# Patient Record
Sex: MALE | Race: BLACK OR AFRICAN AMERICAN | NOT HISPANIC OR LATINO | URBAN - METROPOLITAN AREA
[De-identification: names, ages, dates, MRNs, and addresses within clinical notes are randomized per-mention and may not be internally consistent; named-entity substitution may affect disease eponyms.]

---

## 2023-09-08 ENCOUNTER — EMERGENCY (EMERGENCY)
Facility: HOSPITAL | Age: 28
LOS: 1 days | Discharge: HOME | End: 2023-09-08
Attending: EMERGENCY MEDICINE | Admitting: EMERGENCY MEDICINE
Payer: SELF-PAY

## 2023-09-08 VITALS
RESPIRATION RATE: 16 BRPM | OXYGEN SATURATION: 97 % | TEMPERATURE: 98 F | HEART RATE: 82 BPM | SYSTOLIC BLOOD PRESSURE: 103 MMHG | DIASTOLIC BLOOD PRESSURE: 62 MMHG

## 2023-09-08 VITALS
WEIGHT: 190.04 LBS | HEART RATE: 93 BPM | SYSTOLIC BLOOD PRESSURE: 108 MMHG | TEMPERATURE: 98 F | RESPIRATION RATE: 18 BRPM | OXYGEN SATURATION: 96 % | DIASTOLIC BLOOD PRESSURE: 69 MMHG

## 2023-09-08 DIAGNOSIS — R56.9 UNSPECIFIED CONVULSIONS: ICD-10-CM

## 2023-09-08 DIAGNOSIS — G40.909 EPILEPSY, UNSPECIFIED, NOT INTRACTABLE, WITHOUT STATUS EPILEPTICUS: ICD-10-CM

## 2023-09-08 DIAGNOSIS — Z59.01 SHELTERED HOMELESSNESS: ICD-10-CM

## 2023-09-08 DIAGNOSIS — Z91.141 PATIENT'S OTHER NONCOMPLIANCE WITH MEDICATION REGIMEN DUE TO FINANCIAL HARDSHIP: ICD-10-CM

## 2023-09-08 LAB
ALBUMIN SERPL ELPH-MCNC: 3.7 G/DL — SIGNIFICANT CHANGE UP (ref 3.4–5)
ALP SERPL-CCNC: 49 U/L — SIGNIFICANT CHANGE UP (ref 40–120)
ALT FLD-CCNC: 22 U/L — SIGNIFICANT CHANGE UP (ref 12–42)
ANION GAP SERPL CALC-SCNC: 7 MMOL/L — LOW (ref 9–16)
AST SERPL-CCNC: 23 U/L — SIGNIFICANT CHANGE UP (ref 15–37)
BILIRUB SERPL-MCNC: 0.4 MG/DL — SIGNIFICANT CHANGE UP (ref 0.2–1.2)
BUN SERPL-MCNC: 12 MG/DL — SIGNIFICANT CHANGE UP (ref 7–23)
CALCIUM SERPL-MCNC: 9 MG/DL — SIGNIFICANT CHANGE UP (ref 8.5–10.5)
CHLORIDE SERPL-SCNC: 104 MMOL/L — SIGNIFICANT CHANGE UP (ref 96–108)
CO2 SERPL-SCNC: 27 MMOL/L — SIGNIFICANT CHANGE UP (ref 22–31)
CREAT SERPL-MCNC: 1.01 MG/DL — SIGNIFICANT CHANGE UP (ref 0.5–1.3)
EGFR: 105 ML/MIN/1.73M2 — SIGNIFICANT CHANGE UP
GLUCOSE SERPL-MCNC: 105 MG/DL — HIGH (ref 70–99)
HCT VFR BLD CALC: 44.5 % — SIGNIFICANT CHANGE UP (ref 39–50)
HGB BLD-MCNC: 14.9 G/DL — SIGNIFICANT CHANGE UP (ref 13–17)
MAGNESIUM SERPL-MCNC: 2.4 MG/DL — SIGNIFICANT CHANGE UP (ref 1.6–2.6)
MCHC RBC-ENTMCNC: 28.6 PG — SIGNIFICANT CHANGE UP (ref 27–34)
MCHC RBC-ENTMCNC: 33.5 GM/DL — SIGNIFICANT CHANGE UP (ref 32–36)
MCV RBC AUTO: 85.4 FL — SIGNIFICANT CHANGE UP (ref 80–100)
NRBC # BLD: 0 /100 WBCS — SIGNIFICANT CHANGE UP (ref 0–0)
PLATELET # BLD AUTO: 220 K/UL — SIGNIFICANT CHANGE UP (ref 150–400)
POTASSIUM SERPL-MCNC: 3.6 MMOL/L — SIGNIFICANT CHANGE UP (ref 3.5–5.3)
POTASSIUM SERPL-SCNC: 3.6 MMOL/L — SIGNIFICANT CHANGE UP (ref 3.5–5.3)
PROT SERPL-MCNC: 7.7 G/DL — SIGNIFICANT CHANGE UP (ref 6.4–8.2)
RBC # BLD: 5.21 M/UL — SIGNIFICANT CHANGE UP (ref 4.2–5.8)
RBC # FLD: 13.1 % — SIGNIFICANT CHANGE UP (ref 10.3–14.5)
SODIUM SERPL-SCNC: 138 MMOL/L — SIGNIFICANT CHANGE UP (ref 132–145)
VALPROATE SERPL-MCNC: <3 UG/ML — LOW (ref 50–100)
WBC # BLD: 9.19 K/UL — SIGNIFICANT CHANGE UP (ref 3.8–10.5)
WBC # FLD AUTO: 9.19 K/UL — SIGNIFICANT CHANGE UP (ref 3.8–10.5)

## 2023-09-08 PROCEDURE — 99284 EMERGENCY DEPT VISIT MOD MDM: CPT

## 2023-09-08 RX ORDER — DIVALPROEX SODIUM 500 MG/1
2 TABLET, DELAYED RELEASE ORAL
Qty: 60 | Refills: 0
Start: 2023-09-08

## 2023-09-08 SDOH — ECONOMIC STABILITY - HOUSING INSECURITY: SHELTERED HOMELESSNESS: Z59.01

## 2023-09-08 NOTE — ED PROVIDER NOTE - OBJECTIVE STATEMENT
The patient is a poor historian and is from [place] and speaks no English.  H&P obtained with assistance of online Croatian  (and it took some time to figure out that his native language was Croatian).  He was reportedly brought here by EMS from a migrant shelter at [address] [place] [address] after a witnessed grand mal seizure.  He is currently awake, alert and oriented with no medical complaints.  He has a known seizure disorder and is supposed to be taking Depakote, but he has not been taking it for several months.  Blood glucose per EMS was 100, and he was slightly post-ictal appearing, but he has not had any more witnessed seizure activity.  No reports of a fall or trauma or head injury.

## 2023-09-08 NOTE — ED PROVIDER NOTE - NSFOLLOWUPINSTRUCTIONS_ED_ALL_ED_FT
You were evaluated in the Emergency Department for an apparent grand mal seizure.  You should resume taking Depakene as directed & follow up with a neurologist.    A seizure is a sudden burst of abnormal electrical activity in the brain. Seizures usually last from 30 seconds to 2 minutes. The abnormal activity temporarily interrupts normal brain function.    Many types of seizures can affect adults. A seizure can cause many different symptoms depending on where in the brain it starts.    What are the causes?  Common causes of this condition include:    Fever or infection.  Brain injury, head trauma, bleeding in the brain, or tumor.  Low blood sugar.  Metabolic disorders or other conditions that are passed from parent to child (are inherited).  Reaction to a substance, such as a drug or a medicine, or suddenly stopping the use of a substance (withdrawal).  Stroke.  Developmental disorders such as autism or cerebral palsy.    In some cases, the cause of this condition may not be known. Some people who have a seizure never have another one. Seizures usually do not cause brain damage or permanent problems unless they are prolonged. A person who has repeated seizures over time without a clear cause has a condition called epilepsy.    What increases the risk?  You are more likely to develop this condition if you have:    A family history of epilepsy.  Had a tonic-clonic seizure in the past. This is a type of seizure that involves whole-body contraction of muscles and a loss of consciousness.  A history of head trauma, lack of oxygen at birth, or strokes.    What are the signs or symptoms?  There are many different types of seizures. The symptoms vary depending on the type of seizure you have. Symptoms occur during the seizure and may also occur before a seizure (aura) and after a seizure (postictal).        Symptoms during a seizure    Uncontrollable shaking (convulsions).  Stiffening of the body.  Loss of consciousness.  Head nodding.  Staring.  Not responding to sound or touch.  Loss of bladder or bowel control.        Symptoms before a seizure    Fear or anxiety.  Nausea.  Feeling like the room is spinning (vertigo).  A feeling of having seen or heard something before (déjà vu).  Odd tastes or smells.  Changes in vision, such as seeing flashing lights or spots.        Symptoms after a seizure    Confusion.  Sleepiness.  Headache.  Weakness on one side of the body.    How is this diagnosed?  This condition may be diagnosed based on:    A description of your symptoms. Video of your seizures can be helpful.  Your medical history.  A physical exam.    You may also have tests, including:    Blood tests.  CT scan.  MRI.  Electroencephalogram (EEG). This test measures electrical activity in the brain. An EEG can predict whether seizures will return (recur).  A spinal tap (also called a lumbar puncture). This is the removal and testing of fluid that surrounds the brain and spinal cord.    How is this treated?  Most seizures will stop on their own in under 5 minutes, and no treatment is needed. Seizures that last longer than 5 minutes will usually need treatment. Treatment can include:    Medicines given through an IV.  Avoiding known triggers, such as medicines that you take for another condition.  Medicines to treat epilepsy (antiepileptics), if epilepsy caused your seizures.  Surgery to stop seizures, if you have epilepsy that does not respond to medicines.    Follow these instructions at home:      Medicines    Take over-the-counter and prescription medicines only as told by your health care provider.  Avoid any substances that may prevent your medicine from working properly, such as alcohol.        Activity    Do not drive, swim, or do any other activities that would be dangerous if you had another seizure. Wait until your health care provider says it is safe to do them.  If you live in the [place] check with your local DMV (department of motor vehicles) to find out about local driving laws. Each state has specific rules about when you can legally return to driving.  Get enough rest. Lack of sleep can make seizures more likely to occur.        Educating others     Teach friends and family what to do if you have a seizure. They should:    Lay you on the ground to prevent a fall.  Cushion your head and body.  Loosen any tight clothing around your neck.  Turn you on your side. If vomiting occurs, this helps keep your airway clear.  Not hold you down. Holding you down will not stop the seizure.  Not put anything into your mouth.  Know whether or not you need emergency care. For example, they should get help right away if you have a seizure that lasts longer than 5 minutes or have several seizures in a row.  Stay with you until you recover.        General instructions    Contact your health care provider each time you have a seizure.  Avoid anything that has ever triggered a seizure for you.  Keep a seizure diary. Record what you remember about each seizure, especially anything that might have triggered the seizure.  Keep all follow-up visits as told by your health care provider. This is important.    Contact a health care provider if:  You have another seizure.  You have seizures more often.  Your seizure symptoms change.  You continue to have seizures with treatment.  You have symptoms of an infection or illness. This might increase your risk of having a seizure.    Get help right away if:  You have a seizure that:    Lasts longer than 5 minutes.  Is different than previous seizures.  Leaves you unable to speak or use a part of your body.  Makes it harder to breathe.  You have:    A seizure after a head injury.  Multiple seizures in a row.  Confusion or a severe headache right after a seizure.  You do not wake up immediately after a seizure.  You injure yourself during a seizure.    These symptoms may represent a serious problem that is an emergency. Do not wait to see if the symptoms will go away. Get medical help right away. Call your local emergency services (911 in the [place] Do not drive yourself to the hospital.    Summary  Seizures are caused by abnormal electrical activity in the brain. The activity disrupts normal brain function and can cause various symptoms, such as convulsions, abnormal movements, or a change in consciousness.  There are many causes of seizures, including illnesses, medicines, genetic conditions, head injuries, strokes, tumors, substance abuse, or substance withdrawal.  Most seizures will stop on their own in under 5 minutes. Seizures that last longer than 5 minutes are a medical emergency and require immediate treatment.  Many medicines are used to treat seizures. Take over-the-counter and prescription medicines only as told by your health care provider.

## 2023-09-08 NOTE — ED PROVIDER NOTE - NSPTACCESSSVCSAPPTDETAILS_ED_ALL_ED_FT
[place] is [place] [facility] [place] (speaks [place]) & currently living in shelter @[address]  Unemployed.  Needs Neurology follow up and assistance filling prescription for Depakote.  Can communicate on WhatsApp @ [phone].    Thanks!

## 2023-09-08 NOTE — ED PROVIDER NOTE - NSFOLLOWUPCLINICS_GEN_ALL_ED_FT
[facility]  Neurology  [address] [place] [address]  Phone: [phone]  Fax: [phone]

## 2023-09-08 NOTE — ED ADULT NURSE NOTE - OBJECTIVE STATEMENT
Pt is A&OX4. Presented s/p seizure BIBA. No acute distress observed. States not taking medication for 6 months, depakote.

## 2023-09-08 NOTE — ED ADULT TRIAGE NOTE - CHIEF COMPLAINT QUOTE
pt was picked up at a migrant shelter for seizure. Pt arrives A+Ox4 with no medical complaints. states he takes Depakote was last dose was 6 months ago - FS In the field 100- pt from [facility]- [address] [place] [address]- please call [phone] ()

## 2023-09-08 NOTE — ED PROVIDER NOTE - PATIENT PORTAL LINK FT
You can access the FollowMyHealth Patient Portal offered by [facility] by registering at the following website: [email or web address]. By joining [facility]’s FollowMyHealth portal, you will also be able to view your health information using other applications (apps) compatible with our system.

## 2023-09-08 NOTE — ED ADULT NURSE NOTE - CHIEF COMPLAINT QUOTE
pt was picked up at a migrant shelter for seizure. Pt arrives A+Ox4 with no medical complaints. states he takes Depakote was last dose was 6 months ago - FS In the field 100- pt from [facility]- [address] [place] [address]- please call [phone] ()

## 2023-09-08 NOTE — ED PROVIDER NOTE - CLINICAL SUMMARY MEDICAL DECISION MAKING FREE TEXT BOX
Pt with apparent breakthrough seizure likely due to medication noncompliance.  Labs WNL.  No further seizure activity in the ED.  Emphasized importance of resuming Depakene & following up with Neurologist.